# Patient Record
Sex: MALE | Race: WHITE | NOT HISPANIC OR LATINO | Employment: FULL TIME | URBAN - NONMETROPOLITAN AREA
[De-identification: names, ages, dates, MRNs, and addresses within clinical notes are randomized per-mention and may not be internally consistent; named-entity substitution may affect disease eponyms.]

---

## 2023-01-01 ENCOUNTER — HOSPITAL ENCOUNTER (EMERGENCY)
Facility: HOSPITAL | Age: 53
End: 2023-08-25
Attending: EMERGENCY MEDICINE
Payer: COMMERCIAL

## 2023-01-01 DIAGNOSIS — I46.9 CARDIAC ARREST (HCC): Primary | ICD-10-CM

## 2023-01-01 PROCEDURE — 99284 EMERGENCY DEPT VISIT MOD MDM: CPT

## 2023-01-01 PROCEDURE — 96374 THER/PROPH/DIAG INJ IV PUSH: CPT

## 2023-01-01 PROCEDURE — 96375 TX/PRO/DX INJ NEW DRUG ADDON: CPT

## 2023-01-01 PROCEDURE — 36680 INSERT NEEDLE BONE CAVITY: CPT | Performed by: PHYSICIAN ASSISTANT

## 2023-01-01 PROCEDURE — 99285 EMERGENCY DEPT VISIT HI MDM: CPT | Performed by: EMERGENCY MEDICINE

## 2023-01-01 PROCEDURE — 92950 HEART/LUNG RESUSCITATION CPR: CPT

## 2023-01-01 PROCEDURE — 31500 INSERT EMERGENCY AIRWAY: CPT | Performed by: EMERGENCY MEDICINE

## 2023-01-01 PROCEDURE — 92950 HEART/LUNG RESUSCITATION CPR: CPT | Performed by: EMERGENCY MEDICINE

## 2023-01-01 PROCEDURE — 31500 INSERT EMERGENCY AIRWAY: CPT

## 2023-01-01 RX ORDER — SODIUM BICARBONATE 84 MG/ML
INJECTION, SOLUTION INTRAVENOUS CODE/TRAUMA/SEDATION MEDICATION
Status: COMPLETED | OUTPATIENT
Start: 2023-01-01 | End: 2023-01-01

## 2023-01-01 RX ORDER — EPINEPHRINE IN SOD CHLOR,ISO 1 MG/10 ML
SYRINGE (ML) INTRAVENOUS CODE/TRAUMA/SEDATION MEDICATION
Status: COMPLETED | OUTPATIENT
Start: 2023-01-01 | End: 2023-01-01

## 2023-01-01 RX ADMIN — EPINEPHRINE 1 MG: 0.1 INJECTION, SOLUTION ENDOTRACHEAL; INTRACARDIAC; INTRAVENOUS at 10:49

## 2023-01-01 RX ADMIN — EPINEPHRINE 1 MG: 0.1 INJECTION, SOLUTION ENDOTRACHEAL; INTRACARDIAC; INTRAVENOUS at 10:56

## 2023-01-01 RX ADMIN — SODIUM BICARBONATE 100 MEQ: 84 INJECTION, SOLUTION INTRAVENOUS at 10:56

## 2023-01-01 RX ADMIN — SODIUM BICARBONATE 100 MEQ: 84 INJECTION, SOLUTION INTRAVENOUS at 10:49

## 2023-01-01 RX ADMIN — EPINEPHRINE 1 MG: 0.1 INJECTION, SOLUTION ENDOTRACHEAL; INTRACARDIAC; INTRAVENOUS at 10:53

## 2023-01-01 RX ADMIN — SODIUM BICARBONATE 100 MEQ: 84 INJECTION, SOLUTION INTRAVENOUS at 10:53

## 2023-08-25 NOTE — ED PROCEDURE NOTE
Procedure  IO Line    Date/Time: 8/25/2023 10:59 AM    Performed by: Matt Rosado PA-C  Authorized by: Matt Rosado PA-C    Patient location:  ED  Consent:     Consent obtained:  Emergent situation  Universal protocol:     Patient identity confirmed:  Arm band  Procedure details:     Insertion site:  Proximal humerus    Laterality:  Right    Insertion device:  15 gauge IO needle    IO Size:  45mm (yellow)    Number of attempts:  1    Successful placement: yes      Insertion confirmation:  Aspiration of blood/marrow, easy infusion of fluids and stability of the needle  Post-procedure details:     Secured with:  Elastic bandage and transparent dressing                     Matt Rosado PA-C  08/25/23 1100

## 2023-08-25 NOTE — ED NOTES
This nurse spoke to Loreto Flores about report of patient death.   given report and stated additional  would come to hospital.      Tremaine Mancilla RN  08/25/23 1300

## 2023-08-25 NOTE — ED NOTES
[facility] made aware that the  will be taking jurisdiction of body      Morey Litten, TIMBO  16/40/41 5329

## 2023-08-25 NOTE — ED NOTES
1954 is time EMS was dispatched to [facility] in [place] for patient.       Sheree Angelucci, RN  08/25/23 6254

## 2023-08-25 NOTE — RESPIRATORY THERAPY NOTE
RT Ventilator Management Note  Ricarda Mosher 46 y.o. male MRN: [id number]  Unit/Bed#: TR 13A Encounter: [id number]      Daily Screen    No data found in the last 10 encounters. Physical Exam:          Resp Comments: (P) Called to ED for cardiac arrest, CPR in progress on arrival. Assisted with intubation 7. 5ETT with glidescope. 22 at teeth. ETCO2 color change x6 breaths. ETT secure in place and pt bagged until TOD called 1057.

## 2023-08-25 NOTE — ED NOTES
Zeferino Rasheed from [address] [place] [address] spoke with nurse at this time regarding report. Report given to zeferino.       Pankaj Mcdowell RN  08/25/23 1560

## 2023-08-25 NOTE — ED NOTES
Thea Lenz at bedside stating that he has all of the information and photos that he needs and he will releasing the body. Lines removed from patient and body prepared for viewing.  Pt's wife Rupa Johnson stating she will be here in 5 min's to view the body/      Sha Toussaint RN  49/77/36 0165

## 2023-08-25 NOTE — ED PROVIDER NOTES
History  Chief Complaint   Patient presents with   • Cardiac Arrest     Pt seen 15 minutes prior in [employer] facility. Later found slumped in truck unresponsive. Given x3 shocks prior to EMS arrival. Given x2 shocks and 8 epi via EMS. Pt arrived ACLS in progress. Patient is a 59-year-old male who presents to the emergency department due to unwitnessed cardiac arrest patient was found slumped over in his truck at a local truck station by bystanders. Bystanders reported they had witnessed him walking around in the area about 15 minutes prior to this. Patient was pulled from his truck and an AED was attached to shocks were delivered by this EMS arrived found patient to be in cardiac arrest rhythm of asystole with intermittent V-fib patient received 5 shocks from EMS several rounds of epinephrine i-gel airway was in place and [place] device for compressions. Patient arrived to the hospital in asystole cyanotic. History provided by:  EMS personnel  Cardiac Arrest  Witnessed by:  Not witnessed  Incident location:  Work  Time since incident:  50 minutes  Time before BLS initiated:  > 5 minutes  Time before ALS initiated:  > 10 minutes  Condition upon EMS arrival:  Unresponsive  Pulse:  Absent  Initial cardiac rhythm per EMS:  Asystole  Treatments prior to arrival:  ACLS protocol  Medications given prior to ED:  Epinephrine  Airway:  Oropharyngeal  Rhythm on admission to ED:  Asystole      None       No past medical history on file. No past surgical history on file. No family history on file. I have reviewed and agree with the history as documented. No existing history information found. No existing history information found. Review of Systems   Unable to perform ROS: Mental status change   All other systems reviewed and are negative. Physical Exam  Physical Exam  Vitals and nursing note reviewed. Constitutional:       Interventions: He is intubated.       Comments: Unresponsive cyanotic   HENT:      Head: Normocephalic and atraumatic. Mouth/Throat:      Mouth: Mucous membranes are moist.   Eyes:      Comments: Pupils equally round nonreactive   Cardiovascular:      Comments: No spontaneous pulses  Pulmonary:      Effort: He is intubated. Breath sounds: Decreased breath sounds present. Comments: No spontaneous respirations  Abdominal:      General: There is distension. Skin:     General: Skin is warm. Coloration: Skin is cyanotic and pale. Neurological:      Mental Status: He is unresponsive. GCS: GCS eye subscore is 1. GCS verbal subscore is 1. GCS motor subscore is 1. Vital Signs  ED Triage Vitals [08/25/23 1049]   Temp Pulse Resp BP SpO2   -- (!) 0 -- -- --      Temp src Heart Rate Source Patient Position - Orthostatic VS BP Location FiO2 (%)   -- -- -- -- --      Pain Score       --           Vitals:    08/25/23 1049 08/25/23 1051   Pulse: (!) 0 (!) 0         Visual Acuity      ED Medications  Medications   EPINEPHrine (ADRENALIN) injection (1 mg Intravenous Given 8/25/23 1056)   sodium bicarbonate 50 mEq/50 mL injection (100 mEq Intravenous Given 8/25/23 1056)       Diagnostic Studies  Results Reviewed     None                 No orders to display              Procedures  Intubation    Date/Time: 8/25/2023 11:42 AM    Performed by: Meme March DO  Authorized by: Meme March DO    Patient location:  ED  Consent:     Consent obtained:  Emergent situation  Pre-procedure details:     Patient status:  Unresponsive    Mallampati score:  2  Indications:     Indications for intubation comment:  Cardiac arrest  Procedure details:     Preoxygenation:  YOHANNES/LMA    CPR in progress: yes      Intubation method:  Oral    Oral intubation technique:  Glidescope    Laryngoscope blade:   Mac 3    Tube type:  Cuffed    Number of attempts:  1    Tube visualized through cords: yes    Placement assessment:     ETT to lip:  24    Tube secured with:  ETT gregorio Breath sounds:  Equal  Post-procedure details:     Patient tolerance of procedure: Tolerated well, no immediate complications             ED Course  ED Course as of 23 1144   Fri Aug 25, 2023   1057 Time of death 10:57 AM   1107 On arrival the airway was secured with a 7.5 endotracheal tube patient was bag-valve-mask ventilated and CPR was continued 3 rounds of epinephrine and were administered along with IV bicarb. Throughout treatment in the ED on pulse check and rhythm check patient was asystole cardiac ultrasound was performed at final pulse check patient was pulseless with no cardiac activity or motion. At this time the patient at least 50 minutes of downtime and possibly up to 15 minutes of downtime before bystanders found him in addition to this. At this point further resuscitative efforts were felt to be futile and death was pronounced. 320  and family and GOL notified per protocol. Medical Decision Making  Cardiac arrest [facility]): acute illness or injury  Risk  Prescription drug management. Disposition  Final diagnoses:   Cardiac arrest [facility])     Time reflects when diagnosis was documented in both MDM as applicable and the Disposition within this note     Time User Action Codes Description Comment    2023 11:03 AM Yanci Griffith Add [I46.9] Cardiac arrest [facility])       ED Disposition     ED Disposition       Condition   --    Date/Time   Fri Aug 25, 2023 11:02 AM    Comment   --         Follow-up Information    None       Date, Time and Cause of Death    Preliminary Cause of Death: Cardiac arrest [facility])       Patient's Medications    No medications on file       No discharge procedures on file.     PDMP Review     None          ED Provider  Electronically Signed by           Aura Mccauley DO  23 DO Zechariah  23 1148

## 2023-08-25 NOTE — ED NOTES
Pt's wife Esau Noonan was notified of patients death via [facility]. Esau Noonan called ER stating that she was on her way to view body.   Kelsey Hamm made aware and in agreeable of plan      Irene Pina RN  83/06/81 7468